# Patient Record
(demographics unavailable — no encounter records)

---

## 2025-04-16 NOTE — HISTORY OF PRESENT ILLNESS
[FreeTextEntry1] : 68 y/o Mandarin speaking M presents for initial evaluation.  Reason for visit:  Referred by: Dr. Montes     PMHx: denies  PSHx: Medications: omeprazole  Allergies: NKDA  Denies family hx of CRC?IBD.  Smoker:  3/2025 Endoscopy: irregular Z line, moderate gastritis and two linear gastric ulcers. 1.5 cm. Pathology showed: negative H. Pylori, focal intestinal metaplasia.  3/2025 Colonoscopy: 1 x 20 mm and 8 x 2-3 mm colon polyps, diverticulosis and internal hemorrhoid. Polypectomy was done and pathology showed hyperplastic, tubular adenoma polyps.   3/23/25 Initially seen by Dr. Montes with complaints of epigastric pain and hemorrhoidal symptoms.  Advised repeat EGD in 2 months to ensure ulcer healing.  Referred to [facility].  Advised a trial of anusol suppository, carafate and omeprazole.   Patient presents today for initial evaluation.   Complains of rectal bleeding with bowel movements. occasional. BM-  regular.  No pain.

## 2025-04-16 NOTE — PHYSICAL EXAM
[Excoriation] : no perianal excoriation [Skin Tags] : there were no residual hemorrhoidal skin tags seen [Normal] : was normal [None] : there was no rectal mass  [FreeTextEntry1] : Medical assistant was present for the entire exam.  Anoscopy was performed for evaluation of the patients rectal bleeding  history . The risks, benefits and alternatives were reviewed.  A lighted anoscope was passed into the anal canal and the entire anal mucosal surface was inspected..   The findings revealed moderate/ large rpq  internal hemorrhoids. No masses or lesions were identified.  The risks and benefits of rubber band ligation were discussed with the patient including but not limited to bleeding, pain, infection, and the need for future procedures. The anoscope was placed and rubber band ligation was performed of the internal hemorrhoids - rpq with good result. The patient tolerated the procedure well. Appropriate postprocedure instructions were given to the patient.

## 2025-04-16 NOTE — HISTORY OF PRESENT ILLNESS
[FreeTextEntry1] : 70 y/o Mandarin speaking M presents for initial evaluation.  Reason for visit:  Referred by: Dr. Montes     PMHx: denies  PSHx: Medications: omeprazole  Allergies: NKDA  Denies family hx of CRC?IBD.  Smoker:  3/2025 Endoscopy: irregular Z line, moderate gastritis and two linear gastric ulcers. 1.5 cm. Pathology showed: negative H. Pylori, focal intestinal metaplasia.  3/2025 Colonoscopy: 1 x 20 mm and 8 x 2-3 mm colon polyps, diverticulosis and internal hemorrhoid. Polypectomy was done and pathology showed hyperplastic, tubular adenoma polyps.   3/23/25 Initially seen by Dr. Montes with complaints of epigastric pain and hemorrhoidal symptoms.  Advised repeat EGD in 2 months to ensure ulcer healing.  Referred to [facility].  Advised a trial of anusol suppository, carafate and omeprazole.   Patient presents today for initial evaluation.   Complains of rectal bleeding with bowel movements. occasional. BM-  regular.  No pain.